# Patient Record
Sex: FEMALE | Race: WHITE | Employment: FULL TIME
[De-identification: names, ages, dates, MRNs, and addresses within clinical notes are randomized per-mention and may not be internally consistent; named-entity substitution may affect disease eponyms.]

---

## 2017-10-01 ENCOUNTER — HEALTH MAINTENANCE LETTER (OUTPATIENT)
Age: 27
End: 2017-10-01

## 2020-10-29 ENCOUNTER — OFFICE VISIT (OUTPATIENT)
Dept: URGENT CARE | Facility: URGENT CARE | Age: 30
End: 2020-10-29
Payer: COMMERCIAL

## 2020-10-29 VITALS
TEMPERATURE: 99.6 F | SYSTOLIC BLOOD PRESSURE: 122 MMHG | RESPIRATION RATE: 16 BRPM | HEART RATE: 133 BPM | OXYGEN SATURATION: 98 % | DIASTOLIC BLOOD PRESSURE: 60 MMHG | WEIGHT: 210.8 LBS

## 2020-10-29 DIAGNOSIS — Z20.822 EXPOSURE TO COVID-19 VIRUS: Primary | ICD-10-CM

## 2020-10-29 DIAGNOSIS — R05.9 COUGH: ICD-10-CM

## 2020-10-29 PROCEDURE — U0003 INFECTIOUS AGENT DETECTION BY NUCLEIC ACID (DNA OR RNA); SEVERE ACUTE RESPIRATORY SYNDROME CORONAVIRUS 2 (SARS-COV-2) (CORONAVIRUS DISEASE [COVID-19]), AMPLIFIED PROBE TECHNIQUE, MAKING USE OF HIGH THROUGHPUT TECHNOLOGIES AS DESCRIBED BY CMS-2020-01-R: HCPCS | Performed by: NURSE PRACTITIONER

## 2020-10-29 PROCEDURE — 99203 OFFICE O/P NEW LOW 30 MIN: CPT | Performed by: NURSE PRACTITIONER

## 2020-10-29 ASSESSMENT — ENCOUNTER SYMPTOMS
HEADACHES: 0
SORE THROAT: 0
CHILLS: 0
FEVER: 1
NAUSEA: 0
DIARRHEA: 0
COUGH: 1
SHORTNESS OF BREATH: 0
VOMITING: 0
RHINORRHEA: 1

## 2020-10-30 NOTE — PATIENT INSTRUCTIONS
"  Patient Education     Coronavirus Disease 2019 (COVID-19): Prevention  The best prevention is to have no contact with the SARS-CoV-2 virus. There is no vaccine yet.  It s important to keep up on vaccines for other illnesses, including flu and pnuemonia. This is even more true if you re at higher risk for severe illness. Everyone 6 months and older should get a yearly flu vaccine, with rare exceptions.  Cancel travel and other outings  Stay informed about COVID-19 in your area. Follow local instructions. School, sporting events and other activities may be cancelled. You may need to avoid public gatherings.  Stay at least 6 feet away from others as much as possible. This is called \"social distancing.\" You may also be asked to stay at home and isolate yourself. You may hear terms like \"self-isolate, \"quarantine,\"  stay at home,   shelter in place,  and  lockdown.   Don t travel to areas with a COVID-19 outbreak. Don t go on a cruise. It s best to cancel any non-essential travel right now. For the most up-to-date travel advisories, visit the CDC website at www.cdc.gov/coronavirus/2019-ncov/travelers.  When you re at home    Wash your hands often. Use soap and clean, running water for at least 20 seconds. Or, use hand  that has at least 60% alcohol.    Don t touch your eyes, nose, or mouth unless you have clean hands.    Don t kiss someone who is sick.    If you need to cough or sneeze, do it into a tissue. Then throw the tissue into the trash. If you don t have tissues, cough or sneeze into the bend of your elbow.    Try to avoid \"high-touch\" surfaces, like doorknobs, handles, light switches, desks, printers, phones, kitchen counters, tables and bathroom surfaces. Clean these often with disinfectant (read the label for instructions). For cleaning tips, go to www.cdc.gov/coronavirus/2019-ncov/prepare/cleaning-disinfection.html.    Check your home supplies. Consider keeping a 2-week supply of medicines, food, " "and other needed household items.    Make a plan for childcare, work, and ways to stay in touch with others. Know who will help you if you get sick.    Don t be around people who are sick.    Don t share eating or drinking utensils with sick people.    Wash your hands after touching animals. Don t touch animals that may be sick.    If you leave home      Stay at least 6 feet away from all people.    Try to avoid \"high-touch\" public surfaces, like doorknobs, handles, and light switches. If you need to touch these, clean them first with a disinfecting wipe. Or, touch them using a tissue or paper towel.    Use hand  often. Make sure it has at least 60% alcohol.    Don t touch your eyes, nose, or mouth unless you have clean hands.    If you need to cough or sneeze, do it into a tissue. Then, throw the tissue into the trash. If you don t have tissues, cough or sneeze into the bend of your elbow.    Avoid public gatherings. If you do attend public gatherings, stay at least 6 feet away from others. Don t share food, water bottles, or other personal items.    Anyone over age 2 should wear a cloth face mask in public, especially when it s hard to socially distance. This includes public transit, public protests and marches, and crowded stores, bars, and restaurants.  ? The mask should cover both your nose and mouth. You may need to make your own mask using a bandana, T-shirt, or other cloth. See the CDC s instructions on how to make a mask.  ? Face masks are most likely to reduce the spread of COVID-19 when they are widely used by people who are out in the public.    Certain people should not wear a face mask. These include:  ? Children under 2 years old  ? Anyone with a health, developmental, or mental health condition that can be made worse by wearing a mask  ? Anyone who is unconscious or unable to remove the face covering without help. See the CDC s guidance on who should not wear a face mask.  If you are at a " work site    Follow all of the instructions above.    If you feel sick in any way, go home and stay home.    Tell your manager if you are well but live with someone who has COVID-19.    Wash your hands often with soap and clean, running water for at least 20 seconds. Or, use hand  with at least 60% alcohol.    Don t shake hands. Don t have in-person meetings. (Meet over phone or video, if possible.)    Don t use other people s desks, offices, phones or equipment (pens, keyboards, eating or drinking utensils, etc.), if possible.    Use office paco one person at a time. Don t share coffee, tea or food in the office. Don t have meals in groups.    Wear a mask over your nose and mouth.    Clean work surfaces often with disinfectant. These include desks, photocopiers, printers, phones, kitchen counters, fridge door handles, bathroom surfaces, and others.    If you ve been around someone with COVID-19  In the past 14 days, if you ve been around someone who has (or may have) COVID-19:    Contact your care team to ask for advice. Follow all instructions. You may need to stay home and limit your activities for up to 2 weeks.    Take your temperature every morning and evening for at least 14 days. This is to check for fever. Keep a record of the readings.    Watch for symptoms of the virus. (See the CDC s symptom .) If you have symptoms, contact your care team.    Stay home if you re sick for any reason. If you need to go to a clinic or hospital, call ahead to let them know you re coming.    If you ve had COVID-19 in the last 3 months, but now you re symptom-free, your limits are different: You don t need to self-isolate. You don t need to be re-tested for COVID-19, unless you have symptoms of the virus. (If you do develop symptoms, stay home.)    If you had COVID-19 more than 3 months ago, and you ve been exposed again: Treat it like you ve never had COVID-19. Stay home, limit your contact with others,  call your care team, and check for symptoms.  When to contact your care team  Call your care team if you think you have COVID-19 symptoms. These can include fever, cough, trouble breathing, body aches, headaches, chills or repeated shaking with chills, sore throat, loss of taste or smell, or diarrhea (loose, watery poops). Don t go to a clinic or hospital before speaking to your care team.  Last modified date: 10/13/2020  Magdy last reviewed this educational content on 4/1/2020  This information has been modified by your health care provider with permission from the publisher.    [id number] [employer]. [address] [place] [address], [place] [address]. All rights reserved. This information is not intended as a substitute for professional medical care. Always follow your healthcare professional s instructions.

## 2020-10-30 NOTE — PROGRESS NOTES
SUBJECTIVE:   Ana Mina is a 30 year old female presenting with a chief complaint of   Chief Complaint   Patient presents with     Fever     stuffy nose, exposure to possible covid       She is a new patient of [facility].    URI Adult    Onset of symptoms was 2 day(s) ago.  Course of illness is worsening.    Severity moderate  Current and Associated symptoms: fever, loss of smell and taste, cough running nose   Treatment measures tried include None tried.  Predisposing factors include COVID 19 exposure at work      Review of Systems   Constitutional: Positive for fever. Negative for chills.        No sense of taste or smell   HENT: Positive for congestion and rhinorrhea. Negative for ear pain and sore throat.    Respiratory: Positive for cough. Negative for shortness of breath.    Gastrointestinal: Negative for diarrhea, nausea and vomiting.   Neurological: Negative for headaches.   All other systems reviewed and are negative.      History reviewed. No pertinent past medical history.  History reviewed. No pertinent family history.  No current outpatient medications on file.     Social History     Tobacco Use     Smoking status: Never Smoker     Smokeless tobacco: Never Used   Substance Use Topics     Alcohol use: Not on file       OBJECTIVE  /60   Pulse 133   Temp 99.6  F (37.6  C) (Oral)   Resp 16   Wt 95.6 kg (210 lb 12.8 oz)   LMP  (LMP Unknown)   SpO2 98%   Breastfeeding No     Physical Exam  Vitals signs reviewed.   HENT:      Right Ear: External ear normal.      Left Ear: External ear normal.      Nose: Nose normal.   Eyes:      Conjunctiva/sclera: Conjunctivae normal.   Neck:      Musculoskeletal: Normal range of motion.   Cardiovascular:      Rate and Rhythm: Normal rate.      Pulses: Normal pulses.   Pulmonary:      Effort: Pulmonary effort is normal.   Skin:     General: Skin is warm and dry.   Neurological:      General: No focal deficit present.      Mental Status: She is alert and  "oriented to person, place, and time.   Psychiatric:         Mood and Affect: Mood normal.       ASSESSMENT:      ICD-10-CM    1. Exposure to COVID-19 virus  Z20.828 Symptomatic COVID-19 Virus (Coronavirus) by PCR   2. Cough  R05         COVID 19 screening is carried out.  Results will be conveyed to patient once received.    Patient Instructions     Patient Education     Coronavirus Disease 2019 (COVID-19): Prevention  The best prevention is to have no contact with the SARS-CoV-2 virus. There is no vaccine yet.  It s important to keep up on vaccines for other illnesses, including flu and pnuemonia. This is even more true if you re at higher risk for severe illness. Everyone 6 months and older should get a yearly flu vaccine, with rare exceptions.  Cancel travel and other outings  Stay informed about COVID-19 in your area. Follow local instructions. School, sporting events and other activities may be cancelled. You may need to avoid public gatherings.  Stay at least 6 feet away from others as much as possible. This is called \"social distancing.\" You may also be asked to stay at home and isolate yourself. You may hear terms like \"self-isolate, \"quarantine,\"  stay at home,   shelter in place,  and  lockdown.   Don t travel to areas with a COVID-19 outbreak. Don t go on a cruise. It s best to cancel any non-essential travel right now. For the most up-to-date travel advisories, visit the CDC website at www.cdc.gov/coronavirus/2019-ncov/travelers.  When you re at home    Wash your hands often. Use soap and clean, running water for at least 20 seconds. Or, use hand  that has at least 60% alcohol.    Don t touch your eyes, nose, or mouth unless you have clean hands.    Don t kiss someone who is sick.    If you need to cough or sneeze, do it into a tissue. Then throw the tissue into the trash. If you don t have tissues, cough or sneeze into the bend of your elbow.    Try to avoid \"high-touch\" surfaces, like " "doorknobs, handles, light switches, desks, printers, phones, kitchen counters, tables and bathroom surfaces. Clean these often with disinfectant (read the label for instructions). For cleaning tips, go to www.cdc.gov/coronavirus/2019-ncov/prepare/cleaning-disinfection.html.    Check your home supplies. Consider keeping a 2-week supply of medicines, food, and other needed household items.    Make a plan for childcare, work, and ways to stay in touch with others. Know who will help you if you get sick.    Don t be around people who are sick.    Don t share eating or drinking utensils with sick people.    Wash your hands after touching animals. Don t touch animals that may be sick.    If you leave home      Stay at least 6 feet away from all people.    Try to avoid \"high-touch\" public surfaces, like doorknobs, handles, and light switches. If you need to touch these, clean them first with a disinfecting wipe. Or, touch them using a tissue or paper towel.    Use hand  often. Make sure it has at least 60% alcohol.    Don t touch your eyes, nose, or mouth unless you have clean hands.    If you need to cough or sneeze, do it into a tissue. Then, throw the tissue into the trash. If you don t have tissues, cough or sneeze into the bend of your elbow.    Avoid public gatherings. If you do attend public gatherings, stay at least 6 feet away from others. Don t share food, water bottles, or other personal items.    Anyone over age 2 should wear a cloth face mask in public, especially when it s hard to socially distance. This includes public transit, public protests and marches, and crowded stores, bars, and restaurants.  ? The mask should cover both your nose and mouth. You may need to make your own mask using a bandana, T-shirt, or other cloth. See the CDC s instructions on how to make a mask.  ? Face masks are most likely to reduce the spread of COVID-19 when they are widely used by people who are out in the " public.    Certain people should not wear a face mask. These include:  ? Children under 2 years old  ? Anyone with a health, developmental, or mental health condition that can be made worse by wearing a mask  ? Anyone who is unconscious or unable to remove the face covering without help. See the CDC s guidance on who should not wear a face mask.  If you are at a work site    Follow all of the instructions above.    If you feel sick in any way, go home and stay home.    Tell your manager if you are well but live with someone who has COVID-19.    Wash your hands often with soap and clean, running water for at least 20 seconds. Or, use hand  with at least 60% alcohol.    Don t shake hands. Don t have in-person meetings. (Meet over phone or video, if possible.)    Don t use other people s desks, offices, phones or equipment (pens, keyboards, eating or drinking utensils, etc.), if possible.    Use office paco one person at a time. Don t share coffee, tea or food in the office. Don t have meals in groups.    Wear a mask over your nose and mouth.    Clean work surfaces often with disinfectant. These include desks, photocopiers, printers, phones, kitchen counters, fridge door handles, bathroom surfaces, and others.    If you ve been around someone with COVID-19  In the past 14 days, if you ve been around someone who has (or may have) COVID-19:    Contact your care team to ask for advice. Follow all instructions. You may need to stay home and limit your activities for up to 2 weeks.    Take your temperature every morning and evening for at least 14 days. This is to check for fever. Keep a record of the readings.    Watch for symptoms of the virus. (See the CDC s symptom .) If you have symptoms, contact your care team.    Stay home if you re sick for any reason. If you need to go to a clinic or hospital, call ahead to let them know you re coming.    If you ve had COVID-19 in the last 3 months, but now  you re symptom-free, your limits are different: You don t need to self-isolate. You don t need to be re-tested for COVID-19, unless you have symptoms of the virus. (If you do develop symptoms, stay home.)    If you had COVID-19 more than 3 months ago, and you ve been exposed again: Treat it like you ve never had COVID-19. Stay home, limit your contact with others, call your care team, and check for symptoms.  When to contact your care team  Call your care team if you think you have COVID-19 symptoms. These can include fever, cough, trouble breathing, body aches, headaches, chills or repeated shaking with chills, sore throat, loss of taste or smell, or diarrhea (loose, watery poops). Don t go to a clinic or hospital before speaking to your care team.  Last modified date: 10/13/2020  Magdy last reviewed this educational content on 4/1/2020  This information has been modified by your health care provider with permission from the publisher.    [id number] [employer]. [address] [place] [address], [place] [address]. All rights reserved. This information is not intended as a substitute for professional medical care. Always follow your healthcare professional s instructions.

## 2020-10-31 LAB
SARS-COV-2 RNA SPEC QL NAA+PROBE: ABNORMAL
SPECIMEN SOURCE: ABNORMAL

## 2020-11-01 ENCOUNTER — TELEPHONE (OUTPATIENT)
Dept: LAB | Facility: CLINIC | Age: 30
End: 2020-11-01

## 2020-11-01 NOTE — TELEPHONE ENCOUNTER
"Knows she has a positive result. Calling back after message was left    Coronavirus (COVID-19) Notification    Caller Name (Patient, parent, daughter/son, grandparent, etc)  Ana, patient  26weeks pregnant    Reason for call  Notify of Positive Coronavirus (COVID-19) lab results, assess symptoms,  review [facility] recommendations    Lab Result    Lab test:  2019-nCoV rRt-PCR or SARS-CoV-2 PCR    Oropharyngeal AND/OR nasopharyngeal swabs is POSITIVE for 2019-nCoV RNA/SARS-COV-2 PCR (COVID-19 virus)    RN Recommendations/Instructions per [facility] Coronavirus COVID-19 recommendations    Brief introduction script  Introduce self then review script:  \"I am calling on behalf of [facility].  We were notified that your Coronavirus test (COVID-19) for was POSITIVE for the virus.  I have some information to relay to you but first I wanted to mention that the [place] Dept of Health will be contacting you shortly [it's possible MD already called Patient] to talk to you more about how you are feeling and other people you have had contact with who might now also have the virus.  Also, [facility] is Partnering with the [facility] for Covid-19 research, you may be contacted directly by research staff.\"    Assessment (Inquire about Patient's current symptoms)   Assessment   Current Symptoms at time of phone call: (if no symptoms, document No symptoms] Loss of taste only   Symptoms onset (if applicable) 10/27/20     If at time of call, Patients symptoms hare worsened, the Patient should contact 911 or have someone drive them to Emergency Dept promptly:      If Patient calling 911, inform 911 personal that you have tested positive for the Coronavirus (COVID-19).  Place mask on and await 911 to arrive.    If Emergency Dept, If possible, please have another adult drive you to the Emergency Dept but you need to wear mask when in contact with other people.      Review information with Patient    Your " result was positive. This means you have COVID-19 (coronavirus).  We have sent you a letter that reviews the information that I'll be reviewing with you now.    How can I protect others?    If you have symptoms: stay home and away from others (self-isolate) until:    You've had no fever--and no medicine that reduces fever--for 1 full day (24 hours). And       Your other symptoms have gotten better. For example, your cough or breathing has improved. And     At least 10 days have passed since your symptoms started. (If you've been told by a doctor that you have a weak immune system, wait 20 days.)     If you don't have symptoms: Stay home and away from others (self-isolate) until at least 10 days have passed since your first positive COVID-19 test. (10/29/20)  During this time:    Stay in your own room, including for meals. Use your own bathroom if you can.    Stay away from others in your home. No hugging, kissing or shaking hands. No visitors.     Don't go to work, school or anywhere else.     Clean  high touch  surfaces often (doorknobs, counters, handles, etc.). Use a household cleaning spray or wipes. You'll find a full list on the EPA website at www.epa.gov/pesticide-registration/list-n-disinfectants-use-against-sars-cov-2.     Cover your mouth and nose with a mask, tissue or other face covering to avoid spreading germs.    Wash your hands and face often with soap and water.    Caregivers in these groups are at risk for severe illness due to COVID-19:  o People 65 years and older  o People who live in a nursing home or long-term care facility  o People with chronic disease (lung, heart, cancer, diabetes, kidney, liver, immunologic)  o People who have a weakened immune system, including those who:  - Are in cancer treatment  - Take medicine that weakens the immune system, such as corticosteroids  - Had a bone marrow or organ transplant  - Have an immune deficiency  - Have poorly controlled HIV or AIDS  - Are  obese (body mass index of 40 or higher)  - Smoke regularly    Caregivers should wear gloves while washing dishes, handling laundry and cleaning bedrooms and bathrooms.    Wash and dry laundry with special caution. Don't shake dirty laundry, and use the warmest water setting you can.    If you have a weakened immune system, ask your doctor about other actions you should take.    For more tips, go to www.cdc.gov/coronavirus/2019-ncov/downloads/10Things.pdf.    You should not go back to work until you meet the guidelines above for ending your home isolation. You don't need to be retested for COVID-19 before going back to work--studies show that you won't spread the virus if it's been at least 10 days since your symptoms started (or 20 days, if you have a weak immune system).    Employers: This document serves as formal notice of your employee's medical guidelines for going back to work. They must meet the above guidelines before going back to work in person.    How can I take care of myself?    1. Get lots of rest. Drink extra fluids (unless a doctor has told you not to).    2. Take Tylenol (acetaminophen) for fever or pain. If you have liver or kidney problems, ask your family doctor if it's okay to take Tylenol.     Take either:     650 mg (two 325 mg pills) every 4 to 6 hours, or     1,000 mg (two 500 mg pills) every 8 hours as needed.     Note: Don't take more than 3,000 mg in one day. Acetaminophen is found in many medicines (both prescribed and over-the-counter medicines). Read all labels to be sure you don't take too much.    For children, check the Tylenol bottle for the right dose (based on their age or weight).    3. If you have other health problems (like cancer, heart failure, an organ transplant or severe kidney disease): Call your specialty clinic if you don't feel better in the next 2 days.    4. Know when to call 911: Emergency warning signs include:    Trouble breathing or shortness of breath    Pain  or pressure in the chest that doesn't go away    Feeling confused like you haven't felt before, or not being able to wake up    Bluish-colored lips or face    5. Sign up for [employer]. We know it's scary to hear that you have COVID-19. We want to track your symptoms to make sure you're okay over the next 2 weeks. Please look for an email from [employer]--this is a free, online program that we'll use to keep in touch. To sign up, follow the link in the email. Learn more at [email or web address].    Where can I get more information?    [facility]: [email or web address]    Coronavirus Basics: www.[email or web address]./diseases/coronavirus/basics.html    What to Do If You're Sick: www.cdc.gov/coronavirus/2019-ncov/about/steps-when-sick.html    Ending Home Isolation: www.cdc.gov/coronavirus/2019-ncov/hcp/disposition-in-home-patients.html     Caring for Someone with COVID-19: www.cdc.gov/coronavirus/2019-ncov/if-you-are-sick/care-for-someone.html     [facility] clinical trials (COVID-19 research studies): clinicalaffairs.[facility]/n-clinical-trials     A Positive COVID-19 letter will be sent via [employer] or the mail. (Exception, no letters sent to Presurgerical/Preprocedure Patients)      Martine Burger RN on 11/1/2020 at 11:34 AM

## 2020-11-01 NOTE — TELEPHONE ENCOUNTER
Coronavirus (COVID-19) Notification    Reason for call  Notify of POSITIVE  COVID-19 lab result, assess symptoms,  review [facility] recommendations    Lab Result   Lab test for 2019-nCoV rRt-PCR or SARS-COV-2 PCR  Oropharyngeal AND/OR nasopharyngeal swabs were POSITIVE for 2019-nCoV RNA [OR] SARS-COV-2 RNA (COVID-19) RNA     We have been unable to reach Patient by phone at this time to notify of their Positive COVID-19 result.  Left voicemail message requesting a call back to [phone] [facility] for results.        POSITIVE COVID-19 Letter sent.    Margaret Aguilar, MSN, RN

## 2020-12-12 ENCOUNTER — HEALTH MAINTENANCE LETTER (OUTPATIENT)
Age: 30
End: 2020-12-12

## 2021-09-26 ENCOUNTER — HEALTH MAINTENANCE LETTER (OUTPATIENT)
Age: 31
End: 2021-09-26

## 2022-01-16 ENCOUNTER — HEALTH MAINTENANCE LETTER (OUTPATIENT)
Age: 32
End: 2022-01-16

## 2023-01-08 ENCOUNTER — HEALTH MAINTENANCE LETTER (OUTPATIENT)
Age: 33
End: 2023-01-08

## 2023-04-23 ENCOUNTER — HEALTH MAINTENANCE LETTER (OUTPATIENT)
Age: 33
End: 2023-04-23